# Patient Record
Sex: FEMALE | URBAN - METROPOLITAN AREA
[De-identification: names, ages, dates, MRNs, and addresses within clinical notes are randomized per-mention and may not be internally consistent; named-entity substitution may affect disease eponyms.]

---

## 2022-03-24 ENCOUNTER — OFFICE VISIT (OUTPATIENT)
Dept: URBAN - METROPOLITAN AREA CLINIC 60 | Facility: CLINIC | Age: 72
End: 2022-03-24
Payer: MEDICARE

## 2022-03-24 DIAGNOSIS — H50.17 ALTERNATING EXOTROPIA WITH V PATTERN: Primary | ICD-10-CM

## 2022-03-24 DIAGNOSIS — H25.13 AGE-RELATED NUCLEAR CATARACT, BILATERAL: ICD-10-CM

## 2022-03-24 DIAGNOSIS — H04.123 TEAR FILM INSUFFICIENCY OF BILATERAL LACRIMAL GLANDS: ICD-10-CM

## 2022-03-24 PROCEDURE — 92004 COMPRE OPH EXAM NEW PT 1/>: CPT | Performed by: OPTOMETRIST

## 2022-03-24 ASSESSMENT — INTRAOCULAR PRESSURE
OD: 13
OS: 15

## 2022-03-24 NOTE — IMPRESSION/PLAN
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 Plan: Patient educated regarding findings. Double vision due to head positioning. Patient states symptoms are intermittent. No glasses/prism Rx given today. Monitor.

## 2022-03-24 NOTE — IMPRESSION/PLAN
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 by patient's complaint Plan: Recommend patient use artificial tears OU.